# Patient Record
Sex: FEMALE | Race: WHITE
[De-identification: names, ages, dates, MRNs, and addresses within clinical notes are randomized per-mention and may not be internally consistent; named-entity substitution may affect disease eponyms.]

---

## 2017-03-09 ENCOUNTER — HOSPITAL ENCOUNTER (OUTPATIENT)
Dept: HOSPITAL 15 - LAB | Age: 82
Discharge: HOME | End: 2017-03-09
Attending: INTERNAL MEDICINE
Payer: MEDICARE

## 2017-03-09 DIAGNOSIS — R79.1: Primary | ICD-10-CM

## 2017-03-09 LAB
INR PPP: 1.5 (ref 0.7–1.3)
PROTHROMBIN TIME: 17.7 SEC (ref 9–12)

## 2017-03-09 PROCEDURE — 85610 PROTHROMBIN TIME: CPT

## 2017-04-20 ENCOUNTER — HOSPITAL ENCOUNTER (OUTPATIENT)
Dept: HOSPITAL 15 - LAB | Age: 82
Discharge: HOME | End: 2017-04-20
Attending: INTERNAL MEDICINE
Payer: MEDICARE

## 2017-04-20 DIAGNOSIS — R79.1: Primary | ICD-10-CM

## 2017-04-20 LAB
INR PPP: 4.7 (ref 0.7–1.3)
PROTHROMBIN TIME: 56.3 SEC (ref 9–12)

## 2017-04-20 PROCEDURE — 85610 PROTHROMBIN TIME: CPT

## 2017-05-03 ENCOUNTER — HOSPITAL ENCOUNTER (OUTPATIENT)
Dept: HOSPITAL 15 - LAB | Age: 82
Discharge: HOME | End: 2017-05-03
Attending: INTERNAL MEDICINE
Payer: MEDICARE

## 2017-05-03 DIAGNOSIS — R79.1: Primary | ICD-10-CM

## 2017-05-03 LAB
INR PPP: 3.2 (ref 0.7–1.3)
PROTHROMBIN TIME: 38.8 SEC (ref 9–12)

## 2017-05-03 PROCEDURE — 85610 PROTHROMBIN TIME: CPT

## 2017-05-17 ENCOUNTER — HOSPITAL ENCOUNTER (OUTPATIENT)
Dept: HOSPITAL 15 - LAB | Age: 82
Discharge: HOME | End: 2017-05-17
Attending: INTERNAL MEDICINE
Payer: MEDICARE

## 2017-05-17 DIAGNOSIS — R79.1: Primary | ICD-10-CM

## 2017-05-17 LAB
INR PPP: 2.3 (ref 0.7–1.3)
PROTHROMBIN TIME: 27.7 SEC (ref 9–12)

## 2017-05-17 PROCEDURE — 85610 PROTHROMBIN TIME: CPT

## 2017-06-14 ENCOUNTER — HOSPITAL ENCOUNTER (OUTPATIENT)
Dept: HOSPITAL 15 - LAB | Age: 82
Discharge: HOME | End: 2017-06-14
Attending: INTERNAL MEDICINE
Payer: MEDICARE

## 2017-06-14 DIAGNOSIS — R79.1: Primary | ICD-10-CM

## 2017-06-14 DIAGNOSIS — N39.0: ICD-10-CM

## 2017-06-14 LAB
INR PPP: 2.3 (ref 0.7–1.3)
PROTHROMBIN TIME: 27.1 SEC (ref 9–12)

## 2017-06-14 PROCEDURE — 81003 URINALYSIS AUTO W/O SCOPE: CPT

## 2017-06-14 PROCEDURE — 87086 URINE CULTURE/COLONY COUNT: CPT

## 2017-06-14 PROCEDURE — 85610 PROTHROMBIN TIME: CPT

## 2017-08-03 ENCOUNTER — HOSPITAL ENCOUNTER (OUTPATIENT)
Dept: HOSPITAL 15 - LAB | Age: 82
Discharge: HOME | End: 2017-08-03
Attending: INTERNAL MEDICINE
Payer: MEDICARE

## 2017-08-03 DIAGNOSIS — R79.1: Primary | ICD-10-CM

## 2017-08-03 LAB
INR PPP: 1.8 (ref 0.7–1.3)
PROTHROMBIN TIME: 21.2 SEC (ref 9–12)

## 2017-08-03 PROCEDURE — 85610 PROTHROMBIN TIME: CPT

## 2017-08-24 ENCOUNTER — HOSPITAL ENCOUNTER (OUTPATIENT)
Dept: HOSPITAL 15 - LAB | Age: 82
Discharge: HOME | End: 2017-08-24
Attending: INTERNAL MEDICINE
Payer: MEDICARE

## 2017-08-24 DIAGNOSIS — R79.1: Primary | ICD-10-CM

## 2017-08-24 LAB
INR PPP: 1.6 (ref 0.7–1.3)
PROTHROMBIN TIME: 18.6 SEC (ref 9–12)

## 2017-08-24 PROCEDURE — 85610 PROTHROMBIN TIME: CPT

## 2017-09-19 ENCOUNTER — HOSPITAL ENCOUNTER (OUTPATIENT)
Dept: HOSPITAL 15 - LAB | Age: 82
Discharge: HOME | End: 2017-09-19
Attending: INTERNAL MEDICINE
Payer: MEDICARE

## 2017-09-19 DIAGNOSIS — R79.1: Primary | ICD-10-CM

## 2017-09-19 LAB
INR PPP: 1.4 (ref 0.7–1.3)
PROTHROMBIN TIME: 17.2 SEC (ref 9–12)

## 2017-09-19 PROCEDURE — 85610 PROTHROMBIN TIME: CPT

## 2017-11-02 ENCOUNTER — HOSPITAL ENCOUNTER (OUTPATIENT)
Dept: HOSPITAL 15 - LAB | Age: 82
Discharge: HOME | End: 2017-11-02
Attending: INTERNAL MEDICINE
Payer: MEDICARE

## 2017-11-02 DIAGNOSIS — R79.1: Primary | ICD-10-CM

## 2017-11-02 LAB
INR PPP: 2.6 (ref 0.7–1.3)
PROTHROMBIN TIME: 30.7 SEC (ref 9–12)

## 2017-11-02 PROCEDURE — 85610 PROTHROMBIN TIME: CPT

## 2017-11-30 ENCOUNTER — HOSPITAL ENCOUNTER (OUTPATIENT)
Dept: HOSPITAL 15 - LAB | Age: 82
Discharge: HOME | End: 2017-11-30
Attending: INTERNAL MEDICINE
Payer: MEDICARE

## 2017-11-30 DIAGNOSIS — R79.1: Primary | ICD-10-CM

## 2017-11-30 LAB
INR PPP: 0.9 (ref 0.7–1.3)
PROTHROMBIN TIME: 11.2 SEC (ref 9–12)

## 2017-11-30 PROCEDURE — 85610 PROTHROMBIN TIME: CPT

## 2017-12-20 ENCOUNTER — HOSPITAL ENCOUNTER (OUTPATIENT)
Dept: HOSPITAL 15 - RAD HDHVI | Age: 82
Discharge: HOME | End: 2017-12-20
Attending: INTERNAL MEDICINE
Payer: MEDICARE

## 2017-12-20 DIAGNOSIS — I10: ICD-10-CM

## 2017-12-20 DIAGNOSIS — I08.0: Primary | ICD-10-CM

## 2017-12-20 PROCEDURE — 93306 TTE W/DOPPLER COMPLETE: CPT

## 2018-01-08 ENCOUNTER — HOSPITAL ENCOUNTER (OUTPATIENT)
Dept: HOSPITAL 15 - LAB | Age: 83
Discharge: HOME | End: 2018-01-08
Attending: INTERNAL MEDICINE
Payer: MEDICARE

## 2018-01-08 DIAGNOSIS — D51.9: ICD-10-CM

## 2018-01-08 DIAGNOSIS — R79.89: ICD-10-CM

## 2018-01-08 DIAGNOSIS — D52.9: Primary | ICD-10-CM

## 2018-01-08 DIAGNOSIS — R79.1: ICD-10-CM

## 2018-01-08 DIAGNOSIS — E61.1: ICD-10-CM

## 2018-01-08 LAB
FERRITIN SERPL-MCNC: 8.4 NG/ML (ref 10–322)
IRON SERPL-MCNC: 11 UG/DL (ref 50–170)
TIBC SERPL-MCNC: 322 UG/DL (ref 250–450)

## 2018-01-08 PROCEDURE — 83540 ASSAY OF IRON: CPT

## 2018-01-08 PROCEDURE — 82746 ASSAY OF FOLIC ACID SERUM: CPT

## 2018-01-08 PROCEDURE — 85610 PROTHROMBIN TIME: CPT

## 2018-01-08 PROCEDURE — 82728 ASSAY OF FERRITIN: CPT

## 2018-01-08 PROCEDURE — 82607 VITAMIN B-12: CPT

## 2018-01-08 PROCEDURE — 83550 IRON BINDING TEST: CPT

## 2018-02-07 ENCOUNTER — HOSPITAL ENCOUNTER (OUTPATIENT)
Dept: HOSPITAL 15 - LAB | Age: 83
Discharge: HOME | End: 2018-02-07
Attending: INTERNAL MEDICINE
Payer: MEDICARE

## 2018-02-07 DIAGNOSIS — R79.1: Primary | ICD-10-CM

## 2018-02-07 PROCEDURE — 85610 PROTHROMBIN TIME: CPT

## 2018-03-07 ENCOUNTER — HOSPITAL ENCOUNTER (OUTPATIENT)
Dept: HOSPITAL 15 - LAB | Age: 83
Discharge: HOME | End: 2018-03-07
Attending: INTERNAL MEDICINE
Payer: MEDICARE

## 2018-03-07 DIAGNOSIS — R79.1: Primary | ICD-10-CM

## 2018-03-07 PROCEDURE — 85610 PROTHROMBIN TIME: CPT

## 2018-03-08 ENCOUNTER — HOSPITAL ENCOUNTER (OUTPATIENT)
Dept: HOSPITAL 15 - RAD HDHVI | Age: 83
Discharge: HOME | End: 2018-03-08
Attending: INTERNAL MEDICINE
Payer: MEDICARE

## 2018-03-08 DIAGNOSIS — I34.0: Primary | ICD-10-CM

## 2018-03-08 PROCEDURE — 93306 TTE W/DOPPLER COMPLETE: CPT

## 2018-03-22 ENCOUNTER — HOSPITAL ENCOUNTER (OUTPATIENT)
Dept: HOSPITAL 15 - RAD HDHVI | Age: 83
Discharge: HOME | End: 2018-03-22
Attending: INTERNAL MEDICINE
Payer: MEDICARE

## 2018-03-22 VITALS — BODY MASS INDEX: 22.2 KG/M2 | WEIGHT: 130 LBS | HEIGHT: 64 IN
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
## 2018-03-22 DIAGNOSIS — S69.90XA: Primary | ICD-10-CM
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
## 2018-03-22 DIAGNOSIS — R06.02: ICD-10-CM

## 2018-03-22 DIAGNOSIS — I10: ICD-10-CM
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
## 2018-03-22 PROCEDURE — 96375 TX/PRO/DX INJ NEW DRUG ADDON: CPT

## 2018-03-22 PROCEDURE — 96374 THER/PROPH/DIAG INJ IV PUSH: CPT

## 2018-03-22 PROCEDURE — 78472 GATED HEART PLANAR SINGLE: CPT

## 2018-06-27 ENCOUNTER — HOSPITAL ENCOUNTER (OUTPATIENT)
Dept: HOSPITAL 15 - LAB | Age: 83
Discharge: HOME | End: 2018-06-27
Attending: INTERNAL MEDICINE
Payer: MEDICARE

## 2018-06-27 DIAGNOSIS — E03.9: ICD-10-CM

## 2018-06-27 DIAGNOSIS — I10: ICD-10-CM

## 2018-06-27 DIAGNOSIS — E55.9: ICD-10-CM

## 2018-06-27 DIAGNOSIS — Z00.01: Primary | ICD-10-CM

## 2018-06-27 DIAGNOSIS — E78.00: ICD-10-CM

## 2018-06-27 DIAGNOSIS — D51.9: ICD-10-CM

## 2018-06-27 DIAGNOSIS — E11.9: ICD-10-CM

## 2018-06-27 DIAGNOSIS — N39.0: ICD-10-CM

## 2018-06-27 LAB
ALBUMIN SERPL-MCNC: 3.6 G/DL (ref 3.4–5)
ALP SERPL-CCNC: 62 U/L (ref 45–117)
ALT SERPL-CCNC: 12 U/L (ref 13–56)
ANION GAP SERPL CALCULATED.3IONS-SCNC: 8 MMOL/L (ref 5–15)
BILIRUB SERPL-MCNC: 0.3 MG/DL (ref 0.2–1)
BUN SERPL-MCNC: 22 MG/DL (ref 7–18)
BUN/CREAT SERPL: 22.9
CALCIUM SERPL-MCNC: 8.6 MG/DL (ref 8.5–10.1)
CHLORIDE SERPL-SCNC: 109 MMOL/L (ref 98–107)
CHOLEST SERPL-MCNC: 174 MG/DL (ref ?–200)
CO2 SERPL-SCNC: 24 MMOL/L (ref 21–32)
GLUCOSE SERPL-MCNC: 88 MG/DL (ref 74–106)
HCT VFR BLD AUTO: 27.9 % (ref 36–46)
HDLC SERPL-MCNC: 55 MG/DL (ref 40–59)
HGB BLD-MCNC: 8.7 G/DL (ref 12.2–16.2)
MCH RBC QN AUTO: 25.5 PG (ref 28–32)
MCV RBC AUTO: 81.9 FL (ref 80–100)
NRBC BLD QL AUTO: 0 %
POTASSIUM SERPL-SCNC: 4.6 MMOL/L (ref 3.5–5.1)
PROT SERPL-MCNC: 6.1 G/DL (ref 6.4–8.2)
SODIUM SERPL-SCNC: 141 MMOL/L (ref 136–145)
T4 FREE SERPL-MCNC: 1.35 NG/DL (ref 0.89–1.76)
TRIGL SERPL-MCNC: 154 MG/DL (ref ?–150)

## 2018-06-27 PROCEDURE — 81003 URINALYSIS AUTO W/O SCOPE: CPT

## 2018-06-27 PROCEDURE — 80053 COMPREHEN METABOLIC PANEL: CPT

## 2018-06-27 PROCEDURE — 83036 HEMOGLOBIN GLYCOSYLATED A1C: CPT

## 2018-06-27 PROCEDURE — 82306 VITAMIN D 25 HYDROXY: CPT

## 2018-06-27 PROCEDURE — 84439 ASSAY OF FREE THYROXINE: CPT

## 2018-06-27 PROCEDURE — 80061 LIPID PANEL: CPT

## 2018-06-27 PROCEDURE — 82607 VITAMIN B-12: CPT

## 2018-06-27 PROCEDURE — 36415 COLL VENOUS BLD VENIPUNCTURE: CPT

## 2018-06-27 PROCEDURE — 85025 COMPLETE CBC W/AUTO DIFF WBC: CPT

## 2018-06-27 PROCEDURE — 84443 ASSAY THYROID STIM HORMONE: CPT

## 2018-10-15 ENCOUNTER — HOSPITAL ENCOUNTER (OUTPATIENT)
Dept: HOSPITAL 15 - RAD HDHVI | Age: 83
Discharge: HOME | End: 2018-10-15
Attending: INTERNAL MEDICINE
Payer: MEDICARE

## 2018-10-15 VITALS — DIASTOLIC BLOOD PRESSURE: 91 MMHG | SYSTOLIC BLOOD PRESSURE: 152 MMHG

## 2018-10-15 VITALS — SYSTOLIC BLOOD PRESSURE: 157 MMHG | DIASTOLIC BLOOD PRESSURE: 70 MMHG

## 2018-10-15 DIAGNOSIS — M48.061: ICD-10-CM

## 2018-10-15 DIAGNOSIS — I51.7: ICD-10-CM

## 2018-10-15 DIAGNOSIS — S22.089A: Primary | ICD-10-CM

## 2018-10-15 DIAGNOSIS — M51.26: ICD-10-CM
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
## 2018-10-15 DIAGNOSIS — K44.9: ICD-10-CM
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
## 2018-10-15 DIAGNOSIS — I70.0: ICD-10-CM
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
## 2018-10-15 PROCEDURE — 82565 ASSAY OF CREATININE: CPT

## 2018-10-15 PROCEDURE — 72131 CT LUMBAR SPINE W/O DYE: CPT

## 2018-10-15 PROCEDURE — 71260 CT THORAX DX C+: CPT

## 2018-11-13 ENCOUNTER — HOSPITAL ENCOUNTER (OUTPATIENT)
Dept: HOSPITAL 15 - LAB | Age: 83
Discharge: HOME | End: 2018-11-13
Attending: INTERNAL MEDICINE
Payer: MEDICARE

## 2018-11-13 DIAGNOSIS — N39.0: Primary | ICD-10-CM

## 2018-11-13 PROCEDURE — 87086 URINE CULTURE/COLONY COUNT: CPT

## 2018-11-13 PROCEDURE — 81003 URINALYSIS AUTO W/O SCOPE: CPT

## 2018-11-15 ENCOUNTER — HOSPITAL ENCOUNTER (OUTPATIENT)
Dept: HOSPITAL 15 - CHF HDHVI | Age: 83
Discharge: HOME | End: 2018-11-15
Attending: INTERNAL MEDICINE
Payer: MEDICARE

## 2018-11-15 VITALS — SYSTOLIC BLOOD PRESSURE: 138 MMHG | DIASTOLIC BLOOD PRESSURE: 56 MMHG

## 2018-11-15 VITALS — DIASTOLIC BLOOD PRESSURE: 51 MMHG | SYSTOLIC BLOOD PRESSURE: 143 MMHG

## 2018-11-15 DIAGNOSIS — I70.0: ICD-10-CM

## 2018-11-15 DIAGNOSIS — E11.9: ICD-10-CM

## 2018-11-15 DIAGNOSIS — Z95.0: ICD-10-CM

## 2018-11-15 DIAGNOSIS — I10: ICD-10-CM

## 2018-11-15 DIAGNOSIS — R53.1: ICD-10-CM

## 2018-11-15 DIAGNOSIS — E03.9: ICD-10-CM

## 2018-11-15 DIAGNOSIS — R53.81: ICD-10-CM

## 2018-11-15 DIAGNOSIS — I48.91: ICD-10-CM

## 2018-11-15 DIAGNOSIS — D51.9: Primary | ICD-10-CM

## 2018-11-15 DIAGNOSIS — E78.00: ICD-10-CM

## 2018-11-15 DIAGNOSIS — E55.9: ICD-10-CM

## 2018-11-15 DIAGNOSIS — R53.83: ICD-10-CM

## 2018-11-15 LAB
ALBUMIN SERPL-MCNC: 3.2 G/DL (ref 3.4–5)
ALP SERPL-CCNC: 88 U/L (ref 45–117)
ALT SERPL-CCNC: 14 U/L (ref 13–56)
ANION GAP SERPL CALCULATED.3IONS-SCNC: 9 MMOL/L (ref 5–15)
BILIRUB SERPL-MCNC: 0.2 MG/DL (ref 0.2–1)
BUN SERPL-MCNC: 19 MG/DL (ref 7–18)
BUN/CREAT SERPL: 21.8
CALCIUM SERPL-MCNC: 7.8 MG/DL (ref 8.5–10.1)
CHLORIDE SERPL-SCNC: 113 MMOL/L (ref 98–107)
CO2 SERPL-SCNC: 22 MMOL/L (ref 21–32)
GLUCOSE SERPL-MCNC: 72 MG/DL (ref 74–106)
HCT VFR BLD AUTO: 26.3 % (ref 36–46)
HGB BLD-MCNC: 8.3 G/DL (ref 12.2–16.2)
MCH RBC QN AUTO: 26.7 PG (ref 28–32)
MCV RBC AUTO: 84.9 FL (ref 80–100)
NRBC BLD QL AUTO: 0.3 %
POTASSIUM SERPL-SCNC: 4.6 MMOL/L (ref 3.5–5.1)
PROT SERPL-MCNC: 5.7 G/DL (ref 6.4–8.2)
SODIUM SERPL-SCNC: 144 MMOL/L (ref 136–145)

## 2018-11-15 PROCEDURE — 82306 VITAMIN D 25 HYDROXY: CPT

## 2018-11-15 PROCEDURE — 83036 HEMOGLOBIN GLYCOSYLATED A1C: CPT

## 2018-11-15 PROCEDURE — 36415 COLL VENOUS BLD VENIPUNCTURE: CPT

## 2018-11-15 PROCEDURE — 85025 COMPLETE CBC W/AUTO DIFF WBC: CPT

## 2018-11-15 PROCEDURE — 96372 THER/PROPH/DIAG INJ SC/IM: CPT

## 2018-11-15 PROCEDURE — 84443 ASSAY THYROID STIM HORMONE: CPT

## 2018-11-15 PROCEDURE — 82607 VITAMIN B-12: CPT

## 2018-11-15 PROCEDURE — 80053 COMPREHEN METABOLIC PANEL: CPT

## 2018-11-15 RX ADMIN — CYANOCOBALAMIN ONE MCG: 1000 INJECTION, SOLUTION INTRAMUSCULAR at 12:50

## 2018-11-20 ENCOUNTER — HOSPITAL ENCOUNTER (OUTPATIENT)
Dept: HOSPITAL 15 - CATH | Age: 83
Discharge: HOME | End: 2018-11-20
Attending: INTERNAL MEDICINE
Payer: MEDICARE

## 2018-11-20 VITALS — DIASTOLIC BLOOD PRESSURE: 80 MMHG | SYSTOLIC BLOOD PRESSURE: 158 MMHG

## 2018-11-20 VITALS — SYSTOLIC BLOOD PRESSURE: 154 MMHG | DIASTOLIC BLOOD PRESSURE: 73 MMHG

## 2018-11-20 VITALS — SYSTOLIC BLOOD PRESSURE: 164 MMHG | DIASTOLIC BLOOD PRESSURE: 75 MMHG

## 2018-11-20 VITALS — DIASTOLIC BLOOD PRESSURE: 67 MMHG | SYSTOLIC BLOOD PRESSURE: 139 MMHG

## 2018-11-20 DIAGNOSIS — Z88.2: ICD-10-CM

## 2018-11-20 DIAGNOSIS — I10: ICD-10-CM

## 2018-11-20 DIAGNOSIS — I49.5: ICD-10-CM

## 2018-11-20 DIAGNOSIS — D64.9: Primary | ICD-10-CM

## 2018-11-20 DIAGNOSIS — Z81.0: ICD-10-CM

## 2018-11-20 DIAGNOSIS — Z88.1: ICD-10-CM

## 2018-11-20 PROCEDURE — 36430 TRANSFUSION BLD/BLD COMPNT: CPT

## 2018-11-20 PROCEDURE — 86850 RBC ANTIBODY SCREEN: CPT

## 2018-11-20 PROCEDURE — 86900 BLOOD TYPING SEROLOGIC ABO: CPT

## 2018-11-20 PROCEDURE — 86920 COMPATIBILITY TEST SPIN: CPT

## 2018-11-20 PROCEDURE — 86901 BLOOD TYPING SEROLOGIC RH(D): CPT

## 2018-11-26 ENCOUNTER — HOSPITAL ENCOUNTER (OUTPATIENT)
Dept: HOSPITAL 15 - RAD HDHVI | Age: 83
Discharge: HOME | End: 2018-11-26
Attending: INTERNAL MEDICINE
Payer: MEDICARE

## 2018-11-26 DIAGNOSIS — M41.85: ICD-10-CM

## 2018-11-26 DIAGNOSIS — D64.9: ICD-10-CM

## 2018-11-26 DIAGNOSIS — M43.8X5: ICD-10-CM

## 2018-11-26 DIAGNOSIS — I70.8: ICD-10-CM

## 2018-11-26 DIAGNOSIS — K44.9: Primary | ICD-10-CM

## 2018-11-26 PROCEDURE — 85025 COMPLETE CBC W/AUTO DIFF WBC: CPT

## 2018-11-26 PROCEDURE — 36415 COLL VENOUS BLD VENIPUNCTURE: CPT

## 2018-11-26 PROCEDURE — 74176 CT ABD & PELVIS W/O CONTRAST: CPT

## 2018-11-27 LAB
HCT VFR BLD AUTO: 32.2 % (ref 36–46)
HGB BLD-MCNC: 9.9 G/DL (ref 12.2–16.2)
MCH RBC QN AUTO: 26.3 PG (ref 28–32)
MCV RBC AUTO: 85.2 FL (ref 80–100)
NRBC BLD QL AUTO: 0.1 %

## 2018-12-17 ENCOUNTER — HOSPITAL ENCOUNTER (OUTPATIENT)
Dept: HOSPITAL 15 - RAD HDHVI | Age: 83
Discharge: HOME | End: 2018-12-17
Attending: INTERNAL MEDICINE
Payer: MEDICARE

## 2018-12-17 DIAGNOSIS — I11.9: Primary | ICD-10-CM

## 2018-12-17 DIAGNOSIS — R79.1: ICD-10-CM

## 2018-12-17 DIAGNOSIS — M85.80: ICD-10-CM

## 2018-12-17 DIAGNOSIS — M40.295: ICD-10-CM

## 2018-12-17 DIAGNOSIS — R79.89: ICD-10-CM

## 2018-12-17 DIAGNOSIS — I70.0: ICD-10-CM

## 2018-12-17 DIAGNOSIS — D64.9: ICD-10-CM

## 2018-12-17 LAB
ANION GAP SERPL CALCULATED.3IONS-SCNC: 4 MMOL/L (ref 5–15)
APTT PPP: 32.5 SEC (ref 23.78–33.04)
BUN SERPL-MCNC: 25 MG/DL (ref 7–18)
BUN/CREAT SERPL: 25.5
CALCIUM SERPL-MCNC: 8.6 MG/DL (ref 8.5–10.1)
CHLORIDE SERPL-SCNC: 112 MMOL/L (ref 98–107)
CO2 SERPL-SCNC: 25 MMOL/L (ref 21–32)
GLUCOSE SERPL-MCNC: 86 MG/DL (ref 74–106)
HCT VFR BLD AUTO: 31.8 % (ref 36–46)
HGB BLD-MCNC: 10.1 G/DL (ref 12.2–16.2)
INR PPP: 1.28 (ref 0.9–1.15)
MCH RBC QN AUTO: 27 PG (ref 28–32)
MCV RBC AUTO: 85.3 FL (ref 80–100)
NRBC BLD QL AUTO: 0.2 %
POTASSIUM SERPL-SCNC: 4.3 MMOL/L (ref 3.5–5.1)
PROTHROMBIN TIME: 13.5 SEC (ref 9.27–12.13)
SODIUM SERPL-SCNC: 141 MMOL/L (ref 136–145)

## 2018-12-17 PROCEDURE — 85025 COMPLETE CBC W/AUTO DIFF WBC: CPT

## 2018-12-17 PROCEDURE — 80048 BASIC METABOLIC PNL TOTAL CA: CPT

## 2018-12-17 PROCEDURE — 36415 COLL VENOUS BLD VENIPUNCTURE: CPT

## 2018-12-17 PROCEDURE — 85730 THROMBOPLASTIN TIME PARTIAL: CPT

## 2018-12-17 PROCEDURE — 85610 PROTHROMBIN TIME: CPT

## 2018-12-17 PROCEDURE — 71046 X-RAY EXAM CHEST 2 VIEWS: CPT

## 2018-12-17 PROCEDURE — 82728 ASSAY OF FERRITIN: CPT

## 2018-12-25 ENCOUNTER — HOSPITAL ENCOUNTER (INPATIENT)
Dept: HOSPITAL 15 - ER | Age: 83
LOS: 6 days | Discharge: NURSING, REHAB OR LONG-TERM CARE | DRG: 175 | End: 2018-12-31
Attending: INTERNAL MEDICINE | Admitting: NURSE PRACTITIONER
Payer: MEDICARE

## 2018-12-25 VITALS — WEIGHT: 121.25 LBS | BODY MASS INDEX: 21.48 KG/M2 | HEIGHT: 63 IN

## 2018-12-25 DIAGNOSIS — G62.9: ICD-10-CM

## 2018-12-25 DIAGNOSIS — J98.11: ICD-10-CM

## 2018-12-25 DIAGNOSIS — Z95.0: ICD-10-CM

## 2018-12-25 DIAGNOSIS — D64.9: ICD-10-CM

## 2018-12-25 DIAGNOSIS — I11.0: ICD-10-CM

## 2018-12-25 DIAGNOSIS — R64: ICD-10-CM

## 2018-12-25 DIAGNOSIS — I26.99: Primary | ICD-10-CM

## 2018-12-25 DIAGNOSIS — N13.6: ICD-10-CM

## 2018-12-25 DIAGNOSIS — G93.41: ICD-10-CM

## 2018-12-25 DIAGNOSIS — E44.1: ICD-10-CM

## 2018-12-25 DIAGNOSIS — Z87.81: ICD-10-CM

## 2018-12-25 DIAGNOSIS — R62.7: ICD-10-CM

## 2018-12-25 DIAGNOSIS — I49.5: ICD-10-CM

## 2018-12-25 DIAGNOSIS — I95.9: ICD-10-CM

## 2018-12-25 DIAGNOSIS — J18.1: ICD-10-CM

## 2018-12-25 DIAGNOSIS — E03.9: ICD-10-CM

## 2018-12-25 PROCEDURE — 96372 THER/PROPH/DIAG INJ SC/IM: CPT

## 2018-12-25 PROCEDURE — 80048 BASIC METABOLIC PNL TOTAL CA: CPT

## 2018-12-25 PROCEDURE — 97530 THERAPEUTIC ACTIVITIES: CPT

## 2018-12-25 PROCEDURE — 71275 CT ANGIOGRAPHY CHEST: CPT

## 2018-12-25 PROCEDURE — 84484 ASSAY OF TROPONIN QUANT: CPT

## 2018-12-25 PROCEDURE — 87040 BLOOD CULTURE FOR BACTERIA: CPT

## 2018-12-25 PROCEDURE — 85025 COMPLETE CBC W/AUTO DIFF WBC: CPT

## 2018-12-25 PROCEDURE — 97110 THERAPEUTIC EXERCISES: CPT

## 2018-12-25 PROCEDURE — 70450 CT HEAD/BRAIN W/O DYE: CPT

## 2018-12-25 PROCEDURE — 83880 ASSAY OF NATRIURETIC PEPTIDE: CPT

## 2018-12-25 PROCEDURE — 96375 TX/PRO/DX INJ NEW DRUG ADDON: CPT

## 2018-12-25 PROCEDURE — 94761 N-INVAS EAR/PLS OXIMETRY MLT: CPT

## 2018-12-25 PROCEDURE — 71045 X-RAY EXAM CHEST 1 VIEW: CPT

## 2018-12-25 PROCEDURE — 97116 GAIT TRAINING THERAPY: CPT

## 2018-12-25 PROCEDURE — 81001 URINALYSIS AUTO W/SCOPE: CPT

## 2018-12-25 PROCEDURE — 80053 COMPREHEN METABOLIC PANEL: CPT

## 2018-12-25 PROCEDURE — 85610 PROTHROMBIN TIME: CPT

## 2018-12-25 PROCEDURE — 96365 THER/PROPH/DIAG IV INF INIT: CPT

## 2018-12-25 PROCEDURE — 36415 COLL VENOUS BLD VENIPUNCTURE: CPT

## 2018-12-25 PROCEDURE — 85379 FIBRIN DEGRADATION QUANT: CPT

## 2018-12-25 PROCEDURE — 96361 HYDRATE IV INFUSION ADD-ON: CPT

## 2018-12-25 PROCEDURE — 85730 THROMBOPLASTIN TIME PARTIAL: CPT

## 2018-12-25 PROCEDURE — 84443 ASSAY THYROID STIM HORMONE: CPT

## 2018-12-25 PROCEDURE — 93970 EXTREMITY STUDY: CPT

## 2018-12-25 PROCEDURE — 83605 ASSAY OF LACTIC ACID: CPT

## 2018-12-25 PROCEDURE — 83735 ASSAY OF MAGNESIUM: CPT

## 2018-12-25 PROCEDURE — 93005 ELECTROCARDIOGRAM TRACING: CPT

## 2018-12-25 PROCEDURE — 80307 DRUG TEST PRSMV CHEM ANLYZR: CPT

## 2018-12-25 PROCEDURE — 97163 PT EVAL HIGH COMPLEX 45 MIN: CPT

## 2018-12-25 PROCEDURE — 87086 URINE CULTURE/COLONY COUNT: CPT

## 2018-12-26 VITALS — SYSTOLIC BLOOD PRESSURE: 118 MMHG | DIASTOLIC BLOOD PRESSURE: 67 MMHG

## 2018-12-26 VITALS — DIASTOLIC BLOOD PRESSURE: 70 MMHG | SYSTOLIC BLOOD PRESSURE: 127 MMHG

## 2018-12-26 VITALS — SYSTOLIC BLOOD PRESSURE: 129 MMHG | DIASTOLIC BLOOD PRESSURE: 70 MMHG

## 2018-12-26 VITALS — SYSTOLIC BLOOD PRESSURE: 102 MMHG | DIASTOLIC BLOOD PRESSURE: 53 MMHG

## 2018-12-26 VITALS — DIASTOLIC BLOOD PRESSURE: 70 MMHG | SYSTOLIC BLOOD PRESSURE: 129 MMHG

## 2018-12-26 LAB
ALBUMIN SERPL-MCNC: 3 G/DL (ref 3.4–5)
ALCOHOL, URINE: < 3 MG/DL (ref 0–5)
ALP SERPL-CCNC: 90 U/L (ref 45–117)
ALT SERPL-CCNC: 12 U/L (ref 13–56)
AMPHETAMINES UR QL SCN: NEGATIVE
ANION GAP SERPL CALCULATED.3IONS-SCNC: 4 MMOL/L (ref 5–15)
APTT PPP: 23.4 SEC (ref 23.78–33.04)
BARBITURATES UR QL SCN: NEGATIVE
BENZODIAZ UR QL SCN: NEGATIVE
BILIRUB SERPL-MCNC: 0.1 MG/DL (ref 0.2–1)
BUN SERPL-MCNC: 20 MG/DL (ref 7–18)
BUN/CREAT SERPL: 25
BZE UR QL SCN: NEGATIVE
CALCIUM SERPL-MCNC: 8.2 MG/DL (ref 8.5–10.1)
CANNABINOIDS UR QL SCN: NEGATIVE
CHLORIDE SERPL-SCNC: 113 MMOL/L (ref 98–107)
CO2 SERPL-SCNC: 26 MMOL/L (ref 21–32)
GLUCOSE SERPL-MCNC: 89 MG/DL (ref 74–106)
HCT VFR BLD AUTO: 29.3 % (ref 36–46)
HGB BLD-MCNC: 9.2 G/DL (ref 12.2–16.2)
INR PPP: 1.95 (ref 0.9–1.15)
MAGNESIUM SERPL-MCNC: 2.3 MG/DL (ref 1.6–2.6)
MCH RBC QN AUTO: 26.3 PG (ref 28–32)
MCV RBC AUTO: 83.9 FL (ref 80–100)
NRBC BLD QL AUTO: 0 %
OPIATES UR QL SCN: NEGATIVE
PCP UR QL SCN: NEGATIVE
POTASSIUM SERPL-SCNC: 4.5 MMOL/L (ref 3.5–5.1)
PROT SERPL-MCNC: 5.7 G/DL (ref 6.4–8.2)
PROTHROMBIN TIME: 20.1 SEC (ref 9.27–12.13)
SODIUM SERPL-SCNC: 143 MMOL/L (ref 136–145)

## 2018-12-26 RX ADMIN — FUROSEMIDE SCH MG: 10 INJECTION, SOLUTION INTRAMUSCULAR; INTRAVENOUS at 17:42

## 2018-12-26 RX ADMIN — CEFTRIAXONE SODIUM SCH MLS/HR: 1 INJECTION, POWDER, FOR SOLUTION INTRAMUSCULAR; INTRAVENOUS at 09:12

## 2018-12-26 RX ADMIN — FERROUS SULFATE TAB EC 325 MG (65 MG FE EQUIVALENT) SCH MG: 325 (65 FE) TABLET DELAYED RESPONSE at 17:42

## 2018-12-26 RX ADMIN — ACETAMINOPHEN PRN MG: 500 TABLET ORAL at 17:44

## 2018-12-26 RX ADMIN — RIVAROXABAN SCH MG: 15 TABLET, FILM COATED ORAL at 18:16

## 2018-12-26 NOTE — NUR
Telemetry admit from ER

SAMINA DOUGLASS admitted to Telemetry unit after SBAR received from ligia.  Patient 
oriented to Adalberto Moscoso, primary RN, unit, room, bed, and unit policies regarding patient 
care and visiting hours. Patient now on continuous telemetry monitoring, tele box # [id number] and 
telemetry reading on arrival to unit is Paced. Patient weighed by bedscale and encouraged to 
call if they need something. All questions and concerns addressed, patient verbalized 
understanding.

## 2018-12-26 NOTE — NUR
ASSUMED PATIENT CARE- NOC SHIFT

PATIENT IS ALERT AND ORIENTED X4, ANSWERS IN COMPLETE SENTENCES AND MAKES APPROPRIATE EYE 
CONTACT.  PATIENT IS ON ROOM AIR, BREATHING EVENLY, NO SIGN OF SOB. PATIENT STATES THAT SHE 
FEELS WEAKNESS ALL OVER HER BODY AND THAT IT IS DIFFICULT FOR HER TO GET COMFORTABLE. 
PATIENT IS IN BED, BED IS LOCKED IN LOWEST POSITION. BED RAILS UP X2 AND HEAD OF BED IS UP 
3O DEGREES FOR SAFETY PRECAUTIONS. BEDSIDE TABLE IS WITHIN REACH, CALL LIGHT WITHIN REACH. 
DISCUSSED POC WITH PATIENT AND INSTRUCTED PATIENT TO CALL PRN; PATIENT VERBALIZED 
UNDERSTANDING. WILL CONTINUE TO MONITOR Q1H AND PRN.

## 2018-12-27 VITALS — SYSTOLIC BLOOD PRESSURE: 115 MMHG | DIASTOLIC BLOOD PRESSURE: 63 MMHG

## 2018-12-27 VITALS — DIASTOLIC BLOOD PRESSURE: 50 MMHG | SYSTOLIC BLOOD PRESSURE: 123 MMHG

## 2018-12-27 VITALS — SYSTOLIC BLOOD PRESSURE: 109 MMHG | DIASTOLIC BLOOD PRESSURE: 59 MMHG

## 2018-12-27 VITALS — SYSTOLIC BLOOD PRESSURE: 102 MMHG | DIASTOLIC BLOOD PRESSURE: 66 MMHG

## 2018-12-27 VITALS — DIASTOLIC BLOOD PRESSURE: 63 MMHG | SYSTOLIC BLOOD PRESSURE: 115 MMHG

## 2018-12-27 VITALS — DIASTOLIC BLOOD PRESSURE: 60 MMHG | SYSTOLIC BLOOD PRESSURE: 123 MMHG

## 2018-12-27 LAB
ANION GAP SERPL CALCULATED.3IONS-SCNC: 6 MMOL/L (ref 5–15)
BUN SERPL-MCNC: 18 MG/DL (ref 7–18)
BUN/CREAT SERPL: 24.7
CALCIUM SERPL-MCNC: 8.1 MG/DL (ref 8.5–10.1)
CHLORIDE SERPL-SCNC: 109 MMOL/L (ref 98–107)
CO2 SERPL-SCNC: 25 MMOL/L (ref 21–32)
GLUCOSE SERPL-MCNC: 97 MG/DL (ref 74–106)
HCT VFR BLD AUTO: 27.1 % (ref 36–46)
HGB BLD-MCNC: 8.8 G/DL (ref 12.2–16.2)
MCH RBC QN AUTO: 27.1 PG (ref 28–32)
MCV RBC AUTO: 83.4 FL (ref 80–100)
NRBC BLD QL AUTO: 0 %
POTASSIUM SERPL-SCNC: 3.7 MMOL/L (ref 3.5–5.1)
SODIUM SERPL-SCNC: 140 MMOL/L (ref 136–145)

## 2018-12-27 RX ADMIN — RIVAROXABAN SCH MG: 15 TABLET, FILM COATED ORAL at 18:37

## 2018-12-27 RX ADMIN — CLOPIDOGREL BISULFATE SCH MG: 75 TABLET ORAL at 10:35

## 2018-12-27 RX ADMIN — FERROUS SULFATE TAB EC 325 MG (65 MG FE EQUIVALENT) SCH MG: 325 (65 FE) TABLET DELAYED RESPONSE at 18:38

## 2018-12-27 RX ADMIN — RIVAROXABAN SCH MG: 15 TABLET, FILM COATED ORAL at 08:35

## 2018-12-27 RX ADMIN — FERROUS SULFATE TAB EC 325 MG (65 MG FE EQUIVALENT) SCH MG: 325 (65 FE) TABLET DELAYED RESPONSE at 08:34

## 2018-12-27 RX ADMIN — FUROSEMIDE SCH MG: 10 INJECTION, SOLUTION INTRAMUSCULAR; INTRAVENOUS at 18:00

## 2018-12-27 RX ADMIN — ACETAMINOPHEN PRN MG: 500 TABLET ORAL at 04:27

## 2018-12-27 RX ADMIN — CEFTRIAXONE SODIUM SCH MLS/HR: 1 INJECTION, POWDER, FOR SOLUTION INTRAMUSCULAR; INTRAVENOUS at 08:35

## 2018-12-27 RX ADMIN — FUROSEMIDE SCH MG: 10 INJECTION, SOLUTION INTRAMUSCULAR; INTRAVENOUS at 05:58

## 2018-12-27 NOTE — NUR
IV removal

IV DC'd with sterile technique, catheter fully intact. Pressure dressing applied to site. 
Patient tolerated procedure well.

## 2018-12-27 NOTE — NUR
IV insertion

IV access obtained, via clean sterile technique by inserting 22 gauge catheter at RIGHT 
FOREARM after 1 attempt. IV secured properly. No trauma to site. Patient tolerated procedure 
well.

## 2018-12-27 NOTE — NUR
ASSUMED PATIENT CARE- NOC SHIFT

PATIENT IS ALERT AND ORIENTED, ANSWERS IN COMPLETE SENTENCES AND MAKES APPROPRIATE EYE 
CONTACT. PATIENT IS IN BED, BED IS LOCKED IN LOWEST POSITION, BED RAILS UP X2, HEAD OF BED 
IS UP 30 DEGREES FOR SAFETY PRECAUTIONS. BEDSIDE TABLE WITHIN REACH, CALL LIGHT WITHIN 
REACH. DISCUSSED POC WITH PATIENT AND INSTRUCTED PATIENT TO CALL PRN; PATIENT VERBALIZED 
UNDERSTANDING. WILL CONTINUE TO MONITOR Q1H AND PRN.

## 2018-12-27 NOTE — NUR
Opening Shift Note

Assumed care of patient, awake, alert, and oriented x4. No S/S of distress/SOB or pain. IV 
in left forearm 18 gauge asymptomatic, intact, patent, and saline locked. Bed locked and in 
lowest position and call light is within reach. Instructed on POC and to call for assist 
PRN, and patient verbalized understanding. Will continue to monitor for changes Q1hr and 
PRN.

## 2018-12-28 VITALS — DIASTOLIC BLOOD PRESSURE: 67 MMHG | SYSTOLIC BLOOD PRESSURE: 106 MMHG

## 2018-12-28 VITALS — DIASTOLIC BLOOD PRESSURE: 56 MMHG | SYSTOLIC BLOOD PRESSURE: 98 MMHG

## 2018-12-28 VITALS — DIASTOLIC BLOOD PRESSURE: 72 MMHG | SYSTOLIC BLOOD PRESSURE: 126 MMHG

## 2018-12-28 VITALS — DIASTOLIC BLOOD PRESSURE: 52 MMHG | SYSTOLIC BLOOD PRESSURE: 120 MMHG

## 2018-12-28 VITALS — DIASTOLIC BLOOD PRESSURE: 41 MMHG | SYSTOLIC BLOOD PRESSURE: 87 MMHG

## 2018-12-28 RX ADMIN — FERROUS SULFATE TAB EC 325 MG (65 MG FE EQUIVALENT) SCH MG: 325 (65 FE) TABLET DELAYED RESPONSE at 18:13

## 2018-12-28 RX ADMIN — CEFTRIAXONE SODIUM SCH MLS/HR: 1 INJECTION, POWDER, FOR SOLUTION INTRAMUSCULAR; INTRAVENOUS at 08:13

## 2018-12-28 RX ADMIN — FUROSEMIDE SCH MG: 10 INJECTION, SOLUTION INTRAMUSCULAR; INTRAVENOUS at 06:00

## 2018-12-28 RX ADMIN — RIVAROXABAN SCH MG: 15 TABLET, FILM COATED ORAL at 18:13

## 2018-12-28 RX ADMIN — RIVAROXABAN SCH MG: 15 TABLET, FILM COATED ORAL at 08:13

## 2018-12-28 RX ADMIN — FERROUS SULFATE TAB EC 325 MG (65 MG FE EQUIVALENT) SCH MG: 325 (65 FE) TABLET DELAYED RESPONSE at 08:13

## 2018-12-28 RX ADMIN — CLOPIDOGREL BISULFATE SCH MG: 75 TABLET ORAL at 12:15

## 2018-12-28 NOTE — NUR
assessment

Per  consult patient wants to go to SNF will dc tomorrow 12/29/18. Patient agrees to SNF 
placement. MD order has been sent to [facility]. Per Johanny at [facility] patient will go 
to room 6A and Dr Barreto is the accepting MD. Call Johanny at [phone] for 
transportation once discharged. 

-------------------------------------------------------------------------------

Addendum: 12/28/18 at 1735 by Ana M Garner 

-------------------------------------------------------------------------------

Amended: Links added.

## 2018-12-28 NOTE — NUR
Opening Shift Note

Assumed care of patient, awake and alert.  No S/S of distress/SOB or pain.  Instructed on 
POC and to call for assist PRN, will continue to monitor for changes Q1hr and PRN.

## 2018-12-28 NOTE — NUR
SNF PLACEMENT



DR. MONDRAGON AT BEDSIDE DISCUSSING HOME HEALTH OPTION WITH PATIENT. PATIENT IS ALREADY 
RECEIVING HOME HEALTH WITH PT. BUT PATIENT IS UNABLE TO GET TO BATHROOM AND WOULD PREFER 
GOING TO A NURSING HOME.

## 2018-12-29 VITALS — DIASTOLIC BLOOD PRESSURE: 51 MMHG | SYSTOLIC BLOOD PRESSURE: 99 MMHG

## 2018-12-29 VITALS — SYSTOLIC BLOOD PRESSURE: 121 MMHG | DIASTOLIC BLOOD PRESSURE: 72 MMHG

## 2018-12-29 VITALS — DIASTOLIC BLOOD PRESSURE: 64 MMHG | SYSTOLIC BLOOD PRESSURE: 112 MMHG

## 2018-12-29 VITALS — SYSTOLIC BLOOD PRESSURE: 116 MMHG | DIASTOLIC BLOOD PRESSURE: 65 MMHG

## 2018-12-29 VITALS — DIASTOLIC BLOOD PRESSURE: 72 MMHG | SYSTOLIC BLOOD PRESSURE: 121 MMHG

## 2018-12-29 VITALS — SYSTOLIC BLOOD PRESSURE: 119 MMHG | DIASTOLIC BLOOD PRESSURE: 54 MMHG

## 2018-12-29 RX ADMIN — CLOPIDOGREL BISULFATE SCH MG: 75 TABLET ORAL at 16:11

## 2018-12-29 RX ADMIN — RIVAROXABAN SCH MG: 15 TABLET, FILM COATED ORAL at 18:14

## 2018-12-29 RX ADMIN — CEFTRIAXONE SODIUM SCH MLS/HR: 1 INJECTION, POWDER, FOR SOLUTION INTRAMUSCULAR; INTRAVENOUS at 08:58

## 2018-12-29 RX ADMIN — FERROUS SULFATE TAB EC 325 MG (65 MG FE EQUIVALENT) SCH MG: 325 (65 FE) TABLET DELAYED RESPONSE at 08:58

## 2018-12-29 RX ADMIN — FERROUS SULFATE TAB EC 325 MG (65 MG FE EQUIVALENT) SCH MG: 325 (65 FE) TABLET DELAYED RESPONSE at 18:14

## 2018-12-29 RX ADMIN — RIVAROXABAN SCH MG: 15 TABLET, FILM COATED ORAL at 08:58

## 2018-12-29 NOTE — NUR
md kaushik sandoval at bedside, clarified re: the order that was placed by md casanova on transfer of 
service to his name since 12/27/18. 

-------------------------------------------------------------------------------

Addendum: 12/29/18 at 1105 by Bobo Dasilva RN RN

-------------------------------------------------------------------------------

as per md sandoval he will inform md alexander wilson that and let md casanova know re: the bed 
availability at [facility]. informed md sandoval that as per patient she spoke with md casanova 
yesterday and he will keep the patient through the weekend.

## 2018-12-29 NOTE — NUR
daughter at bedside, both daughter and patient verbaLIZING THAT THEY DON'T WANT THE PATIENT 
TO BE PLACE ON SNF AND PREFERRED HOME HEALTH SERVICES INSTEAD. WILL LET MD KNOW

## 2018-12-29 NOTE — NUR
OPENING NOTE



RECEIVED REPORT FROM DAYSHIFT RN. ASSUMING ROLE OF CARE OF PATIENT AT THIS TIME. PATIENT 
SHOWING NO SIGN OF DISTRESS, SHORTNESS OF BREATH, AND PATIENT DENIES ANY PAIN AT THIS TIME. 
PATIENT EDUCATED ON PLAN OF CARE FOR THE NIGHT AND PATIENT VERBALIZED UNDERSTANDING. BED 
LOWERED, CALL LIGHT WITHIN REACH AND PATIENT WILL BE ROUNDED ON EVERY HOUR AND AS NEEDED.

## 2018-12-30 VITALS — SYSTOLIC BLOOD PRESSURE: 90 MMHG | DIASTOLIC BLOOD PRESSURE: 45 MMHG

## 2018-12-30 VITALS — DIASTOLIC BLOOD PRESSURE: 61 MMHG | SYSTOLIC BLOOD PRESSURE: 124 MMHG

## 2018-12-30 VITALS — DIASTOLIC BLOOD PRESSURE: 77 MMHG | SYSTOLIC BLOOD PRESSURE: 132 MMHG

## 2018-12-30 VITALS — SYSTOLIC BLOOD PRESSURE: 115 MMHG | DIASTOLIC BLOOD PRESSURE: 53 MMHG

## 2018-12-30 RX ADMIN — FERROUS SULFATE TAB EC 325 MG (65 MG FE EQUIVALENT) SCH MG: 325 (65 FE) TABLET DELAYED RESPONSE at 08:42

## 2018-12-30 RX ADMIN — CEFTRIAXONE SODIUM SCH MLS/HR: 1 INJECTION, POWDER, FOR SOLUTION INTRAMUSCULAR; INTRAVENOUS at 08:41

## 2018-12-30 RX ADMIN — RIVAROXABAN SCH MG: 15 TABLET, FILM COATED ORAL at 08:42

## 2018-12-30 RX ADMIN — ACETAMINOPHEN PRN MG: 500 TABLET ORAL at 08:42

## 2018-12-30 RX ADMIN — RIVAROXABAN SCH MG: 15 TABLET, FILM COATED ORAL at 18:38

## 2018-12-30 RX ADMIN — FERROUS SULFATE TAB EC 325 MG (65 MG FE EQUIVALENT) SCH MG: 325 (65 FE) TABLET DELAYED RESPONSE at 18:38

## 2018-12-30 RX ADMIN — CLOPIDOGREL BISULFATE SCH MG: 75 TABLET ORAL at 11:50

## 2018-12-30 NOTE — NUR
Nutrition Assessment Notes



please see attached link for complete assessment



Est. Needs BW 56k-1680kcal (25-30 kcal/kgBW), 56-67gms pro (1.0-1.2 gms/kgBW). Will 
continue to monitor pertinent labs and reassess nutrient need prn




-------------------------------------------------------------------------------

Addendum: 18 at 1201 by Mabel Shankar RD

-------------------------------------------------------------------------------

Amended: Links added.

## 2018-12-30 NOTE — NUR
MAICOL  CALLED BACK AND INFORMED HER THAT THE PATIENT AND FAMILY DOES NOT WANT TO 
GO TO SNF AND MD DE LA CRUZ IS AWARE AND THERE IS A NEW SOCIAL SERVICE CONSULT PLACED FOR HOME 
HEALTH AND DC PLANNING FOR TOMORROW. AS PER MAICOL SHE WILL TAKE CARE OF IT TOMORROW.

## 2018-12-30 NOTE — NUR
SPOKE WITH PATIENT AND DAUGHTER AND THE PATIENT DID CHANGE HER MIND, SHE WANTED TO GO TO 
[facility] INSTEAD IF SHE WILL BE DISCHARGE TOMORROW. WILL ENDORSE.

## 2018-12-30 NOTE — NUR
OPENING NOTE



RECEIVED REPORT FROM DAYSHIFT RN. ASSUMING ROLE OF CARE OF PATIENT AT THIS TIME. PATIENT 
SHOWING NO SIGN OF DISTRESS, SHORTNESS OF BREATH AND PATIENT DENIES ANY PAIN AT THIS TIME. 
PATIENT EDUCATED ON PLAN OF CARE FOR THE NIGHT AND PATIENT VERBALIZE UNDERSTANDING. 
DISCUSSED WITH PATIENT PLANS FOR DISCHARGE TOMORROW AND PATIENT IS STATING UNCERTAINTY 
REGARDING DISCHARGE AND LOCATION. PATIENT STATES SHE WOULD LIKE TO SPEAK TO THE SOCIAL 
WORKER REGARDING HER CHOICES.  MADE AWARE ON DAYSHIFT ON CHANGES TO LOCATIONS 
AND STATED WILL WORK ON IT TOMORROW. WILL ENDORSE TO DAYSHIFT AND WILL ATTEMPT TO CONTACT IN 
THE MORNING. BED LOWERED, CALL LIGHT WITHIN REACH, AND PATIENT WILL BE ROUNDED ON EVERY HOUR 
AND AS NEEDED.

## 2018-12-30 NOTE — NUR
spoke with md casanova, informed him that as per patient and family, they don't want to go to 
snf and preferred home health service for safety evaluation, physical therapy, v/s and 
medication management instead. as per md casanova he did already place the patient on home 
health under [facility]. will call social service for the consult

## 2018-12-31 VITALS — DIASTOLIC BLOOD PRESSURE: 64 MMHG | SYSTOLIC BLOOD PRESSURE: 111 MMHG

## 2018-12-31 VITALS — SYSTOLIC BLOOD PRESSURE: 112 MMHG | DIASTOLIC BLOOD PRESSURE: 58 MMHG

## 2018-12-31 VITALS — SYSTOLIC BLOOD PRESSURE: 121 MMHG | DIASTOLIC BLOOD PRESSURE: 69 MMHG

## 2018-12-31 VITALS — SYSTOLIC BLOOD PRESSURE: 109 MMHG | DIASTOLIC BLOOD PRESSURE: 56 MMHG

## 2018-12-31 RX ADMIN — RIVAROXABAN SCH MG: 15 TABLET, FILM COATED ORAL at 18:00

## 2018-12-31 RX ADMIN — CEFTRIAXONE SODIUM SCH MLS/HR: 1 INJECTION, POWDER, FOR SOLUTION INTRAMUSCULAR; INTRAVENOUS at 08:20

## 2018-12-31 RX ADMIN — CLOPIDOGREL BISULFATE SCH MG: 75 TABLET ORAL at 08:19

## 2018-12-31 RX ADMIN — FERROUS SULFATE TAB EC 325 MG (65 MG FE EQUIVALENT) SCH MG: 325 (65 FE) TABLET DELAYED RESPONSE at 18:00

## 2018-12-31 RX ADMIN — ACETAMINOPHEN PRN MG: 500 TABLET ORAL at 05:24

## 2018-12-31 RX ADMIN — FERROUS SULFATE TAB EC 325 MG (65 MG FE EQUIVALENT) SCH MG: 325 (65 FE) TABLET DELAYED RESPONSE at 08:19

## 2018-12-31 RX ADMIN — RIVAROXABAN SCH MG: 15 TABLET, FILM COATED ORAL at 08:19

## 2018-12-31 NOTE — NUR
Transport arrived for the patient, via w/c with all personal belongings.  All questions and 
concerns are addressed.  Telemetry unit returned to ICU. No distress noted at time of 
departure.

## 2018-12-31 NOTE — NUR
re-assessment

Patient is now discharged. Per Johanny at [facility] patient will still go to room 6A and Dr Barreto is the accepting. General transport will transport patient between 5pm and 530pm. 
Patient agrees to discharge plan to SNF. [facility] has been notified. 

-------------------------------------------------------------------------------

Addendum: 12/31/18 at 1543 by Ana M JOHNSON

-------------------------------------------------------------------------------

Amended: Links added.

## 2018-12-31 NOTE — NUR
Discharge instructions given as ordered. All questions and concerns addressed. Patient 
verbalized understanding.  Medication reconciliation form completed and copy given to 
patient. Transport here for patient however, she is not finished reading discharge materials 
and will sign papers after she reading. Transport will return for her.

## 2019-04-19 ENCOUNTER — HOSPITAL ENCOUNTER (OUTPATIENT)
Dept: HOSPITAL 15 - LAB | Age: 84
Discharge: HOME | End: 2019-04-19
Attending: INTERNAL MEDICINE
Payer: MEDICARE

## 2019-04-19 DIAGNOSIS — D64.9: ICD-10-CM

## 2019-04-19 DIAGNOSIS — N39.0: Primary | ICD-10-CM

## 2019-04-19 DIAGNOSIS — I11.0: ICD-10-CM

## 2019-04-19 DIAGNOSIS — I50.9: ICD-10-CM

## 2019-04-19 LAB
ALBUMIN SERPL-MCNC: 3.7 G/DL (ref 3.4–5)
ALP SERPL-CCNC: 79 U/L (ref 45–117)
ALT SERPL-CCNC: 14 U/L (ref 13–56)
ANION GAP SERPL CALCULATED.3IONS-SCNC: 8 MMOL/L (ref 5–15)
BILIRUB SERPL-MCNC: 0.3 MG/DL (ref 0.2–1)
BUN SERPL-MCNC: 22 MG/DL (ref 7–18)
BUN/CREAT SERPL: 31.4
CALCIUM SERPL-MCNC: 8.7 MG/DL (ref 8.5–10.1)
CHLORIDE SERPL-SCNC: 112 MMOL/L (ref 98–107)
CO2 SERPL-SCNC: 24 MMOL/L (ref 21–32)
GLUCOSE SERPL-MCNC: 78 MG/DL (ref 74–106)
HCT VFR BLD AUTO: 30.7 % (ref 36–46)
HGB BLD-MCNC: 9.8 G/DL (ref 12.2–16.2)
MCH RBC QN AUTO: 29.7 PG (ref 28–32)
MCV RBC AUTO: 93.4 FL (ref 80–100)
NRBC BLD QL AUTO: 0.1 %
POTASSIUM SERPL-SCNC: 4.7 MMOL/L (ref 3.5–5.1)
PROT SERPL-MCNC: 6 G/DL (ref 6.4–8.2)
SODIUM SERPL-SCNC: 144 MMOL/L (ref 136–145)

## 2019-04-19 PROCEDURE — 81003 URINALYSIS AUTO W/O SCOPE: CPT

## 2019-04-19 PROCEDURE — 85025 COMPLETE CBC W/AUTO DIFF WBC: CPT

## 2019-04-19 PROCEDURE — 80053 COMPREHEN METABOLIC PANEL: CPT

## 2019-04-19 PROCEDURE — 36415 COLL VENOUS BLD VENIPUNCTURE: CPT

## 2019-06-17 ENCOUNTER — HOSPITAL ENCOUNTER (OUTPATIENT)
Dept: HOSPITAL 15 - LAB | Age: 84
Discharge: HOME | End: 2019-06-17
Attending: INTERNAL MEDICINE
Payer: MEDICARE

## 2019-06-17 DIAGNOSIS — R79.1: Primary | ICD-10-CM

## 2019-06-17 PROCEDURE — 85610 PROTHROMBIN TIME: CPT
